# Patient Record
Sex: MALE | Race: ASIAN | NOT HISPANIC OR LATINO
[De-identification: names, ages, dates, MRNs, and addresses within clinical notes are randomized per-mention and may not be internally consistent; named-entity substitution may affect disease eponyms.]

---
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
## 2019-05-10 ENCOUNTER — CHART COPY (OUTPATIENT)
Age: 19
End: 2019-05-10

## 2019-05-10 DIAGNOSIS — M21.70 UNEQUAL LIMB LENGTH (ACQUIRED), UNSPECIFIED SITE: ICD-10-CM

## 2019-10-18 NOTE — ASSESSMENT
[FreeTextEntry1] : phone conversion with mom about hardware removal.\par \par They will try to obtain a current x-ray to evaluate healing.\par \par They will send in a new x-ray done in [place]

## 2022-11-07 NOTE — PROGRESS NOTE ADULT - ASSESSMENT
Patient is a 18y/o M with right leg length discrepancy s/p right femur osteotomy and placement of lengthening IM nail Size Of Margin In Cm: 0.5